# Patient Record
Sex: MALE | Race: BLACK OR AFRICAN AMERICAN | NOT HISPANIC OR LATINO | URBAN - METROPOLITAN AREA
[De-identification: names, ages, dates, MRNs, and addresses within clinical notes are randomized per-mention and may not be internally consistent; named-entity substitution may affect disease eponyms.]

---

## 2020-06-29 ENCOUNTER — EMERGENCY (EMERGENCY)
Facility: HOSPITAL | Age: 45
LOS: 1 days | Discharge: HOME | End: 2020-06-29
Attending: EMERGENCY MEDICINE
Payer: SELF-PAY

## 2020-06-29 VITALS
HEIGHT: 68 IN | HEART RATE: 77 BPM | TEMPERATURE: 98 F | DIASTOLIC BLOOD PRESSURE: 78 MMHG | OXYGEN SATURATION: 99 % | SYSTOLIC BLOOD PRESSURE: 106 MMHG | RESPIRATION RATE: 16 BRPM | WEIGHT: 177.91 LBS

## 2020-06-29 PROCEDURE — 99282 EMERGENCY DEPT VISIT SF MDM: CPT

## 2020-06-29 NOTE — ED PROVIDER NOTE - OBJECTIVE STATEMENT
44 year old male with history of a root canal 8 years ago presenting with pain in the left first molar today. Patient notes that several days ago he thinks that he cracked his tooth while he was eating. Patient called 911 in order to come to the ED and the tooth to be pulled out. Patient denies congestion, runny nose, fever, cough, shortness of breath, chest pain, or any other acute complaints.

## 2020-06-29 NOTE — ED PROVIDER NOTE - CLINICAL SUMMARY MEDICAL DECISION MAKING FREE TEXT BOX
Patient offered the patient analgesia but patient requesting tooth to be pulled out. Educated patient that we are not able to pull out the tooth and provided him with information on emergency and outpatient dental clinics. Discharge the patient and follow up with dentistry.

## 2020-06-29 NOTE — ED ADULT NURSE NOTE - CAS ELECT INFOMATION PROVIDED
DC instructions/seen, treated, and released in intake by Dr. gentile. No nursing intervention required.

## 2020-06-29 NOTE — ED PROVIDER NOTE - ENMT, MLM
Left first molar of the left lower jaw chipped at multiple locations. Not freely moving. No area of inflammation or evidence of infection/abscess.

## 2020-06-29 NOTE — ED PROVIDER NOTE - PATIENT PORTAL LINK FT
You can access the FollowMyHealth Patient Portal offered by [facility] by registering at the following website: [email or web address]. By joining [employer]’s FollowMyHealth portal, you will also be able to view your health information using other applications (apps) compatible with our system.

## 2021-09-15 ENCOUNTER — HOSPITAL ENCOUNTER (EMERGENCY)
Age: 46
Discharge: HOME | End: 2021-09-15
Payer: MEDICARE

## 2021-09-15 VITALS
OXYGEN SATURATION: 99 % | BODY MASS INDEX: 28.92 KG/M2 | DIASTOLIC BLOOD PRESSURE: 79 MMHG | HEIGHT: 70 IN | RESPIRATION RATE: 14 BRPM | HEART RATE: 74 BPM | SYSTOLIC BLOOD PRESSURE: 135 MMHG | WEIGHT: 202 LBS | TEMPERATURE: 98.1 F

## 2021-09-15 DIAGNOSIS — L28.2 PRURITIC RASH: Primary | ICD-10-CM

## 2021-09-15 PROCEDURE — 99283 EMERGENCY DEPT VISIT LOW MDM: CPT

## 2021-09-15 RX ORDER — CETIRIZINE HYDROCHLORIDE 10 MG/1
10 TABLET ORAL DAILY
Qty: 30 TABLET | Refills: 0 | Status: SHIPPED | OUTPATIENT
Start: 2021-09-15 | End: 2021-10-15

## 2021-09-15 ASSESSMENT — ENCOUNTER SYMPTOMS
SHORTNESS OF BREATH: 0
DIARRHEA: 0
NAUSEA: 0
COLOR CHANGE: 0
ABDOMINAL PAIN: 0
VOMITING: 0
CHEST TIGHTNESS: 0

## 2021-09-15 NOTE — ED PROVIDER NOTES
[facility]        Pt Name: Mary Kay Mcclendon  MRN: [id number]  [place] 1975  Date of evaluation: 9/15/2021  Provider: Mirian Ash PA-C  PCP: No primary care provider on file. Note Started: 12:25 PM EDT       ARIANA. I have evaluated this patient. My supervising physician was available for consultation. CHIEF COMPLAINT       Chief Complaint   Patient presents with    Rash     Pt to ER from home for complaint of what he believes is a reaction to a bug bite that started in May. Pt states originally broke out with bumps all over, resolved and now intermittently come and go. Pt states he has severe itching that comes and goes as well. Pt states having swelling in axilla now. HISTORY OF PRESENT ILLNESS   (Location, Timing/Onset, Context/Setting, Quality, Duration, Modifying Factors, Severity, Associated Signs and Symptoms)  Note limiting factors. Chief Complaint: Intermittent rash    Mary Kay Mcclendon is a 41 y.o. male who presents to the emergency department with what he describes as being symptoms of intermittent rash. Patient tells me he got bit by a bug in May. He states initially he broke out with a rash all over his trunk and in his underarms. He states it is since resolved but now gets it intermittently. He states unfortunately a rash he gets is markedly pruritic. He states normally they are very small raised red bumps. He states that they do have a sensation that are on when he scratches them. He states that he most recently had them on the anterior aspect of his chest and on the border just underneath his left arm. When asked to the possibility of any new soaps detergents or deodorants the patient states that this is not what is causing it. He thinks this is sequela from the bug bite. He states that he had the rash this morning but as soon as he got here to the emergency department it seemed to go away.   He states despite that he still has some complaints that it is itchy over the midportion of his chest.  No additional complaints or concerns. He presents the ED for evaluation and treatment. Nursing Notes were all reviewed and agreed with or any disagreements were addressed in the HPI. REVIEW OF SYSTEMS    (2-9 systems for level 4, 10 or more for level 5)     Review of Systems   Constitutional: Negative for activity change, chills and fever. Respiratory: Negative for chest tightness and shortness of breath. Cardiovascular: Negative for chest pain. Gastrointestinal: Negative for abdominal pain, diarrhea, nausea and vomiting. Genitourinary: Negative for dysuria and flank pain. Skin: Positive for rash. Negative for color change. All other systems reviewed and are negative. Positives and Pertinent negatives as per HPI. Except as noted above in the ROS, all other systems were reviewed and negative. PAST MEDICAL HISTORY   History reviewed. No pertinent past medical history. SURGICAL HISTORY   History reviewed. No pertinent surgical history. CURRENTMEDICATIONS       Previous Medications    No medications on file         ALLERGIES     Patient has no known allergies. FAMILYHISTORY     History reviewed. No pertinent family history. SOCIAL HISTORY       Social History     Tobacco Use    Smoking status: Never Smoker    Smokeless tobacco: Never Used   Substance Use Topics    Alcohol use: Yes     Comment: Social    Drug use: Never       SCREENINGS             PHYSICAL EXAM    (up to 7 for level 4, 8 or more for level 5)     ED Triage Vitals [09/15/21 1206]   BP Temp Temp Source Pulse Resp SpO2 Height Weight   135/79 98.1 °F (36.7 °C) Oral 74 14 99 % 5' 10\" (1.778 m) 202 lb (91.6 kg)       Physical Exam  Vitals and nursing note reviewed. Constitutional:       General: He is awake. He is not in acute distress. Appearance: Normal appearance. He is well-developed.  He is not diaphoretic. HENT:      Head: Normocephalic and atraumatic. No raccoon eyes, Kingston's sign or laceration. Right Ear: External ear normal.      Left Ear: External ear normal.   Eyes:      General: No scleral icterus. Right eye: No discharge. Left eye: No discharge. Conjunctiva/sclera: Conjunctivae normal.   Neck:      Vascular: No JVD. Cardiovascular:      Rate and Rhythm: Normal rate and regular rhythm. Heart sounds: No murmur heard. No friction rub. No gallop. Pulmonary:      Effort: Pulmonary effort is normal. No accessory muscle usage or respiratory distress. Breath sounds: Normal breath sounds. No wheezing, rhonchi or rales. Musculoskeletal:      Cervical back: Normal range of motion. Skin:     General: Skin is warm and dry. Comments: At the present time there is no evidence of rash from the patient's waistline up. Lower legs are unremarkable. Reports of some mild pruritus anterior based chest.   Neurological:      Mental Status: He is alert and oriented to person, place, and time. GCS: GCS eye subscore is 4. GCS verbal subscore is 5. GCS motor subscore is 6. Cranial Nerves: No cranial nerve deficit. Sensory: No sensory deficit. Coordination: Coordination normal.   Psychiatric:         Behavior: Behavior normal. Behavior is cooperative. DIAGNOSTIC RESULTS   LABS:    Labs Reviewed - No data to display    When ordered only abnormal lab results are displayed. All other labs were within normal range or not returned as of this dictation. EKG: When ordered, EKG's are interpreted by the Emergency Department Physician in the absence of a cardiologist.  Please see their note for interpretation of EKG.     RADIOLOGY:   Non-plain film images such as CT, Ultrasound and MRI are read by the radiologist. Plain radiographic images are visualized and preliminarily interpreted by the ED Provider with the below findings:        Interpretation per the Radiologist below, if available at the time of this note:    No orders to display     No results found. PROCEDURES   Unless otherwise noted below, none     Procedures    CRITICAL CARE TIME   N/A    CONSULTS:  None      EMERGENCY DEPARTMENT COURSE and DIFFERENTIAL DIAGNOSIS/MDM:   Vitals:    Vitals:    09/15/21 1206   BP: 135/79   Pulse: 74   Resp: 14   Temp: 98.1 °F (36.7 °C)   TempSrc: Oral   SpO2: 99%   Weight: 202 lb (91.6 kg)   Height: 5' 10\" (1.778 m)       Patient was given the following medications:  Medications - No data to display        The patient's detailed history of present illness is documented as above. Upon arrival to the emergency department the patient's vital signs are as documented. The patient is noted to be hemodynamically stable and afebrile. Physical examination findings are as above. At the present time there is no discernible rash. Patient states despite that he still is itchy because he had a rash this morning. I discussed with him I thought that this likely was allergic in nature especially since his been ongoing. Suggested that he investigate for possible new soaps or deodorant since he if indeed this could be causing it. In the interim I will place him on Zyrtec for a month and see if we can control the symptoms. He does not have a primary care provider will be given appropriate referral.  Strict potential instructions for return. The patient has been made aware of the signs and symptoms which would necessitate an immediate return to the emergency department and verbalizes an understanding of these signs and symptoms. My suspicion is low for serious dermatologic pathology including: HHV-6, fifth disease, coxsackievirus, Kawasaki's, HSP, staph scalded skin syndrome, najma mountian spotted fever, chicken pox  meningococcemia, rubella, and/or rubeola at the present time. FINAL IMPRESSION      1.  Intermittent pruritic rash since May 2021          DISPOSITION/PLAN DISPOSITION Decision To Discharge 09/15/2021 12:26:41 PM      PATIENT REFERRED TO:  [facility] Emergency Department  [address]  [facility]  [phone]    If symptoms worsen      DISCHARGE MEDICATIONS:  New Prescriptions    CETIRIZINE (ZYRTEC) 10 MG TABLET    Take 1 tablet by mouth daily       DISCONTINUED MEDICATIONS:  Discontinued Medications    No medications on file              (Please note that portions of this note were completed with a voice recognition program.  Efforts were made to edit the dictations but occasionally words are mis-transcribed.)    Isra Sandoval PA-C (electronically signed)           Terra Vale PA-C  09/15/21 9634

## 2021-10-13 ENCOUNTER — HOSPITAL ENCOUNTER (EMERGENCY)
Age: 46
Discharge: HOME | End: 2021-10-13
Attending: EMERGENCY MEDICINE
Payer: MEDICARE

## 2021-10-13 VITALS
TEMPERATURE: 98.8 F | DIASTOLIC BLOOD PRESSURE: 79 MMHG | SYSTOLIC BLOOD PRESSURE: 133 MMHG | OXYGEN SATURATION: 96 % | HEART RATE: 81 BPM | RESPIRATION RATE: 18 BRPM

## 2021-10-13 DIAGNOSIS — B35.4 TINEA CORPORIS: ICD-10-CM

## 2021-10-13 DIAGNOSIS — H61.21 RIGHT EAR IMPACTED CERUMEN: Primary | ICD-10-CM

## 2021-10-13 PROCEDURE — 99283 EMERGENCY DEPT VISIT LOW MDM: CPT

## 2021-10-13 PROCEDURE — 69209 REMOVE IMPACTED EAR WAX UNI: CPT

## 2021-10-13 PROCEDURE — 6370000000 HC RX 637 (ALT 250 FOR IP): Performed by: EMERGENCY MEDICINE

## 2021-10-13 RX ORDER — CLOTRIMAZOLE 1 %
CREAM (GRAM) TOPICAL
Qty: 24 G | Refills: 1 | Status: SHIPPED | OUTPATIENT
Start: 2021-10-13 | End: 2021-10-20

## 2021-10-13 RX ADMIN — Medication 10 DROP: at 17:46

## 2021-10-13 NOTE — ED PROVIDER NOTES
[facility] [place]  Chief Complaint   Patient presents with    Medication Refill     pt asking for a refill on doxcycline and clomitrozole ointment for reoccurrance rash on left leg    Otalgia     rt ear feels like it is clogged     HISTORY OF PRESENT ILLNESS  Madhu Gilliland is a 41 y.o. male who presents to the ED complaining of 2 separate complaints. First complaint is right ear diminished hearing and feeling like it is clogged and unable to pop. It is not necessarily painful for him though. He denies any fevers or symptoms in the left ear or nasal congestion or sore throat. Secondary complaint is itchy scaling dry rash to both inner thighs. He apparently over a year ago he had a similar rash and was prescribed doxycycline as well as clotrimazole but never got them filled and never took it. He has a rash again. He has tried topical use of alcohol which helps with the itching. No other interventions tried. He is asking specifically for the same medications that he actually has the torn up prescriptions of never got them filled when he was in [place] previously. No other complaints, modifying factors or associated symptoms. Nursing notes reviewed. History reviewed. No pertinent past medical history. History reviewed. No pertinent surgical history. History reviewed. No pertinent family history.   Social History     Socioeconomic History    Marital status: Single     Spouse name: Not on file    Number of children: Not on file    Years of education: Not on file    Highest education level: Not on file   Occupational History    Not on file   Tobacco Use    Smoking status: Never Smoker    Smokeless tobacco: Never Used   Substance and Sexual Activity    Alcohol use: Yes     Comment: Social    Drug use: Never    Sexual activity: Not on file   Other Topics Concern    Not on file   Social History Narrative    Not on file     Social Determinants of Health     Financial Resource Strain:     Difficulty of Paying Living Expenses:    Food Insecurity:     Worried About Running Out of Food in the Last Year:     [address] in the Last Year:    Transportation Needs:     Lack of Transportation (Medical):  Lack of Transportation (Non-Medical):    Physical Activity:     Days of Exercise per Week:     Minutes of Exercise per Session:    Stress:     Feeling of Stress :    Social Connections:     Frequency of Communication with Friends and Family:     Frequency of Social Gatherings with Friends and Family:     Attends [employer] Services:     Active Member of Clubs or Organizations:     Attends Club or Organization Meetings:     Marital Status:    Intimate Partner Violence:     Fear of Current or Ex-Partner:     Emotionally Abused:     Physically Abused:     Sexually Abused:      No current facility-administered medications for this encounter. Current Outpatient Medications   Medication Sig Dispense Refill    clotrimazole (LOTRIMIN) 1 % cream Apply topically 2 times daily for 14 days. 24 g 1    cetirizine (ZYRTEC) 10 MG tablet Take 1 tablet by mouth daily 30 tablet 0     No Known Allergies    REVIEW OF SYSTEMS  6 systems reviewed, pertinent positives per HPI otherwise noted to be negative    PHYSICAL EXAM   /79   Pulse 81   Temp 98.8 °F (37.1 °C) (Oral)   Resp 18   SpO2 96%    GENERAL APPEARANCE: Awake and alert. Cooperative. No acute distress. HEAD: Normocephalic. Atraumatic. EYES: PERRL. EOM's grossly intact. ENT: Mucous membranes are moist.  Oropharynx clear, nasopharynx clear. Left TM is clear. No cerumen noted on the left. Right EAC is completely occluded by cerumen. After disimpaction, ear canal was a little irritated with minimal remaining cerumen, TM not injected or perforated. NECK: Supple. Normal ROM. No LAD. CHEST: Equal symmetric chest rise. RRR  LUNGS: Breathing is unlabored.  Speaking comfortably in full sentences. CTAB  ABDOMEN: Nondistended, nontender  EXTREMITIES: MAEE. No acute deformities. SKIN: Warm and dry. Dry scaling rash without erythema pustules blistering or warmth or significant tenderness noted to both proximal medial thighs consistent with tinea. No urticaria. NEUROLOGICAL: Alert and oriented. Strength is 5/5 in all extremities and sensation is intact. ED COURSE/MDM  The patient's ED course was notable for 2 separate complaints. 1.-Right cerumen impaction. Disimpacted per the procedure note below. No evidence of TM rupture or otitis media. No problems on the left. Feeling only a little better after this was done however there was some EAC irritation, no sign of TM perforation or persistent obstruction or AOM. F/u ENT PRN. 2.-Bilateral inner thigh rash consistent with tinea. Will prescribe clotrimazole. Although he was given doxycycline on the previous visit over a year ago and never actually took it, this does not appear at all bacterial to me and therefore he does not have any indication for an antibiotic and therefore we will just try topical antifungals for now. New PCP referral made. Ear Wax Removal    Date/Time: 10/13/2021 5:48 PM  Performed by: Mariah Duque MD  Authorized by: Mariah Duque MD     Consent:     Consent obtained:  Verbal    Consent given by:  Patient    Risks discussed:  Bleeding, dizziness, incomplete removal, TM perforation, pain and infection    Alternatives discussed:  No treatment  Procedure details:     Location:  R ear    Procedure type: irrigation    Post-procedure details: Inspection:  Macerated skin and TM intact    Hearing quality:  Improved    Patient tolerance of procedure: Tolerated well, no immediate complications        Patient was given scripts for the following medications. I counseled patient how to take these medications.    Discharge Medication List as of 10/13/2021  6:32 PM      START taking these medications    Details   clotrimazole (LOTRIMIN) 1 % cream Apply topically 2 times daily for 14 days. , Disp-24 g, R-1, Print               CLINICAL IMPRESSION  1. Right ear impacted cerumen    2. Tinea corporis        Blood pressure 133/79, pulse 81, temperature 98.8 °F (37.1 °C), temperature source Oral, resp. rate 18, SpO2 96 %. DISPOSITION    I have discussed the findings of today's workup with the patient and addressed the patient's questions and concerns. Important warning signs as well as new or worsening symptoms which would necessitate immediate return to the ED were discussed. The plan is to discharge from the ED at this time, and the patient is in stable condition. The patient acknowledged understanding is agreeable with this plan. Follow-up with:  [facility] Emergency Department  [address] [place] [address]  [phone]  Go to   If symptoms worsen    New PCP referral made          April Gerard, [address]/ [place] [id number] [phone]    Schedule an appointment as soon as possible for a visit in 2 days  For symptom re-evaluation if your ear is not improving as expected      This chart was created using Dragon dictation software. Efforts were made by me to ensure accuracy, however some errors may be present due to limitations of this technology.         Magy Moreno MD  10/13/21 1504

## 2021-11-05 ENCOUNTER — HOSPITAL ENCOUNTER (EMERGENCY)
Age: 46
Discharge: HOME | End: 2021-11-05
Payer: MEDICARE

## 2021-11-05 VITALS
DIASTOLIC BLOOD PRESSURE: 70 MMHG | WEIGHT: 180 LBS | RESPIRATION RATE: 16 BRPM | BODY MASS INDEX: 25.83 KG/M2 | HEART RATE: 82 BPM | TEMPERATURE: 97 F | OXYGEN SATURATION: 98 % | SYSTOLIC BLOOD PRESSURE: 124 MMHG

## 2021-11-05 DIAGNOSIS — R21 RASH AND OTHER NONSPECIFIC SKIN ERUPTION: Primary | ICD-10-CM

## 2021-11-05 PROCEDURE — 99282 EMERGENCY DEPT VISIT SF MDM: CPT

## 2021-11-05 RX ORDER — CEPHALEXIN 500 MG/1
500 CAPSULE ORAL 4 TIMES DAILY
Qty: 40 CAPSULE | Refills: 0 | Status: SHIPPED | OUTPATIENT
Start: 2021-11-05 | End: 2021-11-15

## 2021-11-05 RX ORDER — CLOTRIMAZOLE AND BETAMETHASONE DIPROPIONATE 10; .64 MG/G; MG/G
CREAM TOPICAL
Qty: 45 G | Refills: 0 | Status: SHIPPED | OUTPATIENT
Start: 2021-11-05 | End: 2021-11-27 | Stop reason: SDUPTHER

## 2021-11-05 ASSESSMENT — ENCOUNTER SYMPTOMS
DIARRHEA: 0
COUGH: 0
VOMITING: 0
CHEST TIGHTNESS: 0
ABDOMINAL PAIN: 0
COLOR CHANGE: 1
CONSTIPATION: 0
NAUSEA: 0
SHORTNESS OF BREATH: 0
RESPIRATORY NEGATIVE: 1

## 2021-11-05 ASSESSMENT — PAIN SCALES - GENERAL: PAINLEVEL_OUTOF10: 6

## 2021-11-05 NOTE — ED PROVIDER NOTES
[facility]        Pt Name: Jamal Dixon  MRN: [id number]  [place] 1975  Date of evaluation: 11/5/2021  Provider: DIONI Ludwig  PCP: No primary care provider on file. Note Started: 10:23 AM EDT       ARIANA. I have evaluated this patient. My supervising physician was available for consultation. CHIEF COMPLAINT       Chief Complaint   Patient presents with    Rash     pt reports itchy rash to groin times 6 months       HISTORY OF PRESENT ILLNESS   (Location, Timing/Onset, Context/Setting, Quality, Duration, Modifying Factors, Severity, Associated Signs and Symptoms)  Note limiting factors. Chief Complaint: Rash     Jamal Dixon is a 41 y.o. male with no significant past medical history who presents to the ED with complaint of a rash. Patient states he has an itchy rash to his groin bilaterally for the past 6 months. States he was seen here several weeks ago and states he was placed on antifungal cream.  Patient states he has been using but states has not gotten any improvement with it. States he continues to have rash that is itchy. Denies any pain. Patient states he has some small pustules that have since developed. Denies any bleeding or drainage. Denies any fever chills. Denies any new contacts including soaps, detergents, medicines, foods, cosmetics or lotions. Denies any new clothing. Became concerned and came to the ED for further evaluation treatment. Denies any other rash throughout. Nursing Notes were all reviewed and agreed with or any disagreements were addressed in the HPI. REVIEW OF SYSTEMS    (2-9 systems for level 4, 10 or more for level 5)     Review of Systems   Constitutional: Negative for activity change, appetite change, chills, diaphoresis, fatigue and fever. Respiratory: Negative. Negative for cough, chest tightness and shortness of breath. Cardiovascular: Negative. Negative for chest pain. Gastrointestinal: Negative for abdominal pain, constipation, diarrhea, nausea and vomiting. Genitourinary: Negative for decreased urine volume, difficulty urinating, dysuria, flank pain, frequency, hematuria and urgency. Musculoskeletal: Negative for arthralgias, myalgias, neck pain and neck stiffness. Skin: Positive for color change and rash. Negative for pallor and wound. Neurological: Negative for dizziness, weakness, light-headedness, numbness and headaches. Positives and Pertinent negatives as per HPI. Except as noted above in the ROS, all other systems were reviewed and negative. PAST MEDICAL HISTORY   History reviewed. No pertinent past medical history. SURGICAL HISTORY   History reviewed. No pertinent surgical history. CURRENTMEDICATIONS       Previous Medications    No medications on file         ALLERGIES     Patient has no known allergies. FAMILYHISTORY     History reviewed. No pertinent family history. SOCIAL HISTORY       Social History     Tobacco Use    Smoking status: Never Smoker    Smokeless tobacco: Never Used   Substance Use Topics    Alcohol use: Yes     Comment: Social    Drug use: Never       SCREENINGS             PHYSICAL EXAM    (up to 7 for level 4, 8 or more for level 5)     ED Triage Vitals [11/05/21 1001]   BP Temp Temp Source Pulse Resp SpO2 Height Weight   124/70 97 °F (36.1 °C) Infrared 82 16 98 % -- 180 lb (81.6 kg)       Physical Exam  Constitutional:       General: He is not in acute distress. Appearance: Normal appearance. He is well-developed. He is not ill-appearing, toxic-appearing or diaphoretic. HENT:      Head: Normocephalic and atraumatic. Right Ear: External ear normal.      Left Ear: External ear normal.   Eyes:      General:         Right eye: No discharge. Left eye: No discharge. Pulmonary:      Effort: Pulmonary effort is normal. No respiratory distress. Breath sounds:  No stridor. Musculoskeletal:         General: Normal range of motion. Cervical back: Normal range of motion and neck supple. Skin:     General: Skin is warm and dry. Coloration: Skin is not pale. Findings: Rash present. No erythema. Comments: Patient has slightly raised erythematous rash with some excoriations and what appears to be several small pustules noted to the bilateral thighs and into the inguinal creases bilaterally. Does not involve the perineum or scrotum/testicles. No other rash noted throughout. No rash noted to the palms. There is no induration or fluctuance. No crepitus. No tenderness over this area. There is no scaling. Neurological:      Mental Status: He is alert and oriented to person, place, and time. Psychiatric:         Behavior: Behavior normal.         DIAGNOSTIC RESULTS   LABS:    Labs Reviewed - No data to display    When ordered only abnormal lab results are displayed. All other labs were within normal range or not returned as of this dictation. EKG: When ordered, EKG's are interpreted by the Emergency Department Physician in the absence of a cardiologist.  Please see their note for interpretation of EKG. RADIOLOGY:   Non-plain film images such as CT, Ultrasound and MRI are read by the radiologist. Plain radiographic images are visualized and preliminarily interpreted by the ED Provider with the below findings:        Interpretation per the Radiologist below, if available at the time of this note:    No orders to display     No results found.         PROCEDURES   Unless otherwise noted below, none     Procedures    CRITICAL CARE TIME   N/A    CONSULTS:  None      EMERGENCY DEPARTMENT COURSE and DIFFERENTIAL DIAGNOSIS/MDM:   Vitals:    Vitals:    11/05/21 1001   BP: 124/70   Pulse: 82   Resp: 16   Temp: 97 °F (36.1 °C)   TempSrc: Infrared   SpO2: 98%   Weight: 180 lb (81.6 kg)       Patient was given the following medications:  Medications - No data to display        Patient is a 71-year-old male who presents to the ED with complaint of a rash. Patient has rash noted to the bilateral thighs and inguinal creases bilaterally. There are some excoriations noted. There is a couple small pustules. No induration or fluctuance. Do not believe represents cellulitis, abscess or necrotizing infection at this time. Could potentially be fungal but patient states he is already been on clotrimazole ointment and states has not been improved. Patient symptoms could be related to potential dermatitis secondary to sweating and the skin fold. We will give prescription for Lotrisone cream for home. Will give Keflex for empiric antibiotic coverage given pustules. Low suspicion for staph infection. Low suspicion for angioedema, anaphylaxis, SJS, HSP, TEN, scabies, bedbugs, varicella, milia, erysipelas, scalded skin syndrome, meningococcemia, occult bacteremia, necrotizing fasciitis, folliculitis, cellulitis, abscess or other emergent etiology at this time. FINAL IMPRESSION      1. Rash and other nonspecific skin eruption          DISPOSITION/PLAN   DISPOSITION Decision To Discharge 11/05/2021 10:21:20 AM      PATIENT REFERRED TO:  [facility] Emergency Department  [address]  [phone]  Go to   As needed, If symptoms worsen    RADHA MORTENSEN  Dermatology  [facility] [id number] [phone]  Schedule an appointment as soon as possible for a visit   For a Re-check in   7-10   days. DISCHARGE MEDICATIONS:  New Prescriptions    CEPHALEXIN (KEFLEX) 500 MG CAPSULE    Take 1 capsule by mouth 4 times daily for 10 days    CLOTRIMAZOLE-BETAMETHASONE (LOTRISONE) 1-0.05 % CREAM    Apply topically 2 times daily.        DISCONTINUED MEDICATIONS:  Discontinued Medications    No medications on file              (Please note that portions of this note were completed with a voice recognition program.  Efforts were made to edit the dictations but occasionally words are mis-transcribed.)    DIONI Jean (electronically signed)          DIONI Cm  11/05/21 8994

## 2021-11-27 ENCOUNTER — HOSPITAL ENCOUNTER (EMERGENCY)
Age: 46
Discharge: HOME | End: 2021-11-27
Payer: MEDICARE

## 2021-11-27 VITALS
DIASTOLIC BLOOD PRESSURE: 60 MMHG | SYSTOLIC BLOOD PRESSURE: 129 MMHG | TEMPERATURE: 98.1 F | WEIGHT: 180 LBS | OXYGEN SATURATION: 96 % | HEIGHT: 70 IN | HEART RATE: 79 BPM | RESPIRATION RATE: 20 BRPM | BODY MASS INDEX: 25.77 KG/M2

## 2021-11-27 DIAGNOSIS — B35.6 TINEA CRURIS: Primary | ICD-10-CM

## 2021-11-27 PROCEDURE — 99283 EMERGENCY DEPT VISIT LOW MDM: CPT

## 2021-11-27 RX ORDER — CLOTRIMAZOLE AND BETAMETHASONE DIPROPIONATE 10; .64 MG/G; MG/G
CREAM TOPICAL
Qty: 45 G | Refills: 0 | Status: SHIPPED | OUTPATIENT
Start: 2021-11-27 | End: 2021-11-30

## 2021-11-27 ASSESSMENT — ENCOUNTER SYMPTOMS
NAUSEA: 0
ABDOMINAL PAIN: 0
DIARRHEA: 0
SHORTNESS OF BREATH: 0
CHEST TIGHTNESS: 0
VOMITING: 0

## 2021-11-27 NOTE — ED PROVIDER NOTES
[address] [facility]        Pt Name: Mary Quinn  MRN: [id number]  [place] 1975  Date of evaluation: 11/27/2021  Provider: Colletta Schiff, PA-C  PCP: No primary care provider on file. Note Started: 9:37 AM EST       ARIANA. I have evaluated this patient. My supervising physician was available for consultation. CHIEF COMPLAINT       Chief Complaint   Patient presents with    Medication Refill     Pt states he was seen here recently for a rash on his legs, states he is out of the cream he was prescribed and needs a refill until his drJorge appt. in 2 weeks. HISTORY OF PRESENT ILLNESS   (Location, Timing/Onset, Context/Setting, Quality, Duration, Modifying Factors, Severity, Associated Signs and Symptoms)  Note limiting factors. Chief Complaint: Recurrent tinea cruris    Mary Quinn is a 41 y.o. male who presents to the emergency department with difficulties as a pertains to a inguinal and upper leg rash. Patient states that he has had ongoing difficulties as a pertains to tinea. He had been applying Lotrimin to marginal levels of relief when he had been seen and evaluated in the ED for this in the past.  He states the Lotrisone works much better for him. He is requesting a refill of this. Patient states he still has not seen by primary care provider. He still has not had follow-up for his condition dermatologically. He literally is merely requesting repeat refill for medication. Nursing Notes were all reviewed and agreed with or any disagreements were addressed in the HPI. REVIEW OF SYSTEMS    (2-9 systems for level 4, 10 or more for level 5)     Review of Systems   Constitutional: Negative for activity change, chills and fever. Respiratory: Negative for chest tightness and shortness of breath. Cardiovascular: Negative for chest pain.    Gastrointestinal: Negative for abdominal pain, diarrhea, nausea and vomiting. Genitourinary: Negative for dysuria and flank pain. Skin: Positive for rash. All other systems reviewed and are negative. Positives and Pertinent negatives as per HPI. Except as noted above in the ROS, all other systems were reviewed and negative. PAST MEDICAL HISTORY   History reviewed. No pertinent past medical history. SURGICAL HISTORY   History reviewed. No pertinent surgical history. CURRENTMEDICATIONS       Previous Medications    No medications on file         ALLERGIES     Patient has no known allergies. FAMILYHISTORY     History reviewed. No pertinent family history. SOCIAL HISTORY       Social History     Tobacco Use    Smoking status: Never Smoker    Smokeless tobacco: Never Used   Substance Use Topics    Alcohol use: Yes     Comment: Social    Drug use: Never       SCREENINGS             PHYSICAL EXAM    (up to 7 for level 4, 8 or more for level 5)     ED Triage Vitals [11/27/21 0855]   BP Temp Temp Source Pulse Resp SpO2 Height Weight   129/60 98.1 °F (36.7 °C) Oral 79 20 96 % 5' 10\" (1.778 m) 180 lb (81.6 kg)       Physical Exam  Vitals and nursing note reviewed. Constitutional:       General: He is awake. He is not in acute distress. Appearance: Normal appearance. He is well-developed. He is not diaphoretic. HENT:      Head: Normocephalic and atraumatic. No raccoon eyes, Kingston's sign or laceration. Right Ear: External ear normal.      Left Ear: External ear normal.   Eyes:      General: No scleral icterus. Right eye: No discharge. Left eye: No discharge. Conjunctiva/sclera: Conjunctivae normal.   Neck:      Vascular: No JVD. Cardiovascular:      Rate and Rhythm: Normal rate and regular rhythm. Heart sounds: No murmur heard. No friction rub. No gallop. Pulmonary:      Effort: Pulmonary effort is normal. No accessory muscle usage or respiratory distress. Breath sounds: Normal breath sounds.  No wheezing, rhonchi or rales. Musculoskeletal:      Cervical back: Normal range of motion. Skin:     General: Skin is warm and dry. Comments: Tinea cruris bilateral inguinal region and upper inner thighs. Neurological:      Mental Status: He is alert and oriented to person, place, and time. GCS: GCS eye subscore is 4. GCS verbal subscore is 5. GCS motor subscore is 6. Cranial Nerves: No cranial nerve deficit. Sensory: No sensory deficit. Coordination: Coordination normal.   Psychiatric:         Behavior: Behavior normal. Behavior is cooperative. DIAGNOSTIC RESULTS   LABS:    Labs Reviewed - No data to display    When ordered only abnormal lab results are displayed. All other labs were within normal range or not returned as of this dictation. EKG: When ordered, EKG's are interpreted by the Emergency Department Physician in the absence of a cardiologist.  Please see their note for interpretation of EKG. RADIOLOGY:   Non-plain film images such as CT, Ultrasound and MRI are read by the radiologist. Plain radiographic images are visualized and preliminarily interpreted by the ED Provider with the below findings:        Interpretation per the Radiologist below, if available at the time of this note:    No orders to display     No results found. PROCEDURES   Unless otherwise noted below, none     Procedures    CRITICAL CARE TIME   N/A    CONSULTS:  None      EMERGENCY DEPARTMENT COURSE and DIFFERENTIAL DIAGNOSIS/MDM:   Vitals:    Vitals:    11/27/21 0855   BP: 129/60   Pulse: 79   Resp: 20   Temp: 98.1 °F (36.7 °C)   TempSrc: Oral   SpO2: 96%   Weight: 180 lb (81.6 kg)   Height: 5' 10\" (1.778 m)       Patient was given the following medications:  Medications - No data to display        The patient's detailed history of present illness is documented as above. Upon arrival to the emergency department the patient's vital signs are as documented.  The patient is noted to be hemodynamically stable and afebrile. Physical examination findings are as above. I did explain to the patient that I do have concerns for the recurrence. Have also explained that the medication will be refilled as a courtesy. I suggested that he establish care with a primary care provider. Strict potential instructions for return. The patient has been made aware of the signs and symptoms which would necessitate an immediate return to the emergency department and verbalizes an understanding of these signs and symptoms. My suspicion is low for serious dermatologic pathology including: HHV-6, fifth disease, coxsackievirus, Kawasaki's, HSP, staph scalded skin syndrome, najma mountian spotted fever, chicken pox  meningococcemia, rubella, and/or rubeola at the present time. FINAL IMPRESSION      1.  Tinea cruris          DISPOSITION/PLAN   DISPOSITION Decision To Discharge 11/27/2021 09:37:43 AM      PATIENT REFERRED TO:  [facility] Emergency Department  [address] [place] [id number]    If symptoms worsen      DISCHARGE MEDICATIONS:  New Prescriptions    No medications on file       DISCONTINUED MEDICATIONS:  Discontinued Medications    No medications on file            (Please note that portions of this note were completed with a voice recognition program.  Efforts were made to edit the dictations but occasionally words are mis-transcribed.)    Colletta Schiff, PA-C (electronically signed)           Cornelia Hoffman PA-C  11/27/21 1007

## 2021-11-30 ENCOUNTER — OFFICE VISIT (OUTPATIENT)
Dept: FAMILY MEDICINE CLINIC | Age: 46
End: 2021-11-30
Payer: MEDICARE

## 2021-11-30 VITALS
BODY MASS INDEX: 30.64 KG/M2 | SYSTOLIC BLOOD PRESSURE: 120 MMHG | OXYGEN SATURATION: 98 % | WEIGHT: 214 LBS | HEIGHT: 70 IN | HEART RATE: 94 BPM | DIASTOLIC BLOOD PRESSURE: 86 MMHG

## 2021-11-30 DIAGNOSIS — L30.9 DERMATITIS: ICD-10-CM

## 2021-11-30 DIAGNOSIS — Z00.00 ANNUAL PHYSICAL EXAM: Primary | ICD-10-CM

## 2021-11-30 PROCEDURE — 99386 PREV VISIT NEW AGE 40-64: CPT | Performed by: FAMILY MEDICINE

## 2021-11-30 PROCEDURE — G8484 FLU IMMUNIZE NO ADMIN: HCPCS | Performed by: FAMILY MEDICINE

## 2021-11-30 RX ORDER — CLOTRIMAZOLE 1 %
CREAM (GRAM) TOPICAL
COMMUNITY
Start: 2021-10-25 | End: 2021-11-30

## 2021-11-30 RX ORDER — TRIAMCINOLONE ACETONIDE 1 MG/ML
LOTION TOPICAL
Qty: 60 ML | Refills: 1 | Status: SHIPPED | OUTPATIENT
Start: 2021-11-30 | End: 2021-12-07

## 2021-11-30 SDOH — ECONOMIC STABILITY: FOOD INSECURITY: WITHIN THE PAST 12 MONTHS, THE FOOD YOU BOUGHT JUST DIDN'T LAST AND YOU DIDN'T HAVE MONEY TO GET MORE.: NEVER TRUE

## 2021-11-30 SDOH — ECONOMIC STABILITY: FOOD INSECURITY: WITHIN THE PAST 12 MONTHS, YOU WORRIED THAT YOUR FOOD WOULD RUN OUT BEFORE YOU GOT MONEY TO BUY MORE.: NEVER TRUE

## 2021-11-30 ASSESSMENT — PATIENT HEALTH QUESTIONNAIRE - PHQ9
1. LITTLE INTEREST OR PLEASURE IN DOING THINGS: 0
SUM OF ALL RESPONSES TO PHQ QUESTIONS 1-9: 0
SUM OF ALL RESPONSES TO PHQ9 QUESTIONS 1 & 2: 0
SUM OF ALL RESPONSES TO PHQ QUESTIONS 1-9: 0
SUM OF ALL RESPONSES TO PHQ QUESTIONS 1-9: 0
2. FEELING DOWN, DEPRESSED OR HOPELESS: 0

## 2021-11-30 ASSESSMENT — SOCIAL DETERMINANTS OF HEALTH (SDOH): HOW HARD IS IT FOR YOU TO PAY FOR THE VERY BASICS LIKE FOOD, HOUSING, MEDICAL CARE, AND HEATING?: NOT HARD AT ALL

## 2021-11-30 NOTE — PROGRESS NOTES
[address] Note    Date: 11/30/2021                                               Marielena Gianni:     Chief Complaint   Patient presents with    New Patient    Rash     skin is itchy and tingy       HPI   Patient is here for annual exam.  Last Tdap unknown. Currently takes no medicines. Patient has concerns for itchy skin. Started few months ago after being bit by a bug. Is itchy with some bumps over arms and chest and back. Has not tried medicines. There are no problems to display for this patient. Past Medical History:   Diagnosis Date    Testicular torsion 1995       Current Outpatient Medications   Medication Sig Dispense Refill    triamcinolone (KENALOG) 0.1 % lotion Apply topically 3 times daily. 60 mL 1     No current facility-administered medications for this visit. No Known Allergies    Review of Systems   No CP, no SOB, no bruise, no HA, no vision change, no ankle swelling, no hearing problems, no LAD      Vitals:  /86   Pulse 94   Ht 5' 10\" (1.778 m)   Wt 214 lb (97.1 kg)   SpO2 98%   BMI 30.71 kg/m²     Wt Readings from Last 3 Encounters:   11/30/21 214 lb (97.1 kg)   11/27/21 180 lb (81.6 kg)   11/05/21 180 lb (81.6 kg)        Physical Exam   General:  Well-appearing, NAD, alert, non-toxic  HEENT:  Normocephalic, atraumatic. Pupils equal and round. TMs pearly with good landmarks. Moist mucous membranes. Normal dentition  NECK:  Supple, normal range of motion, no LAD, no meningeal signs, no JVD, nontender  CHEST/LUNGS: CTAB, no crackles, no wheeze, no rhonchi. Symmetric rise  CARDIOVASCULAR: RRR,  no murmur, no rub  ABDOMEN: Soft, non-tender, non-distended. No masses  EXTREMETIES: Normal movement of all extremities. No edema. No joint swelling. SKIN: + Minor scale and occasional erythematous minute papules over bilateral arms and back. No drainage noted.   PSYCH:  A+O x 3; normal affect  NEURO:  GCS 15, CN2-12 grossly intact, no focal motor/sensory deficits, no cerebellar deficits, normal gait, normal speech      Assessment/Plan     71-year-old male here for annual exam.  Tdap up-to-date. Patient declines flu shot. Check routine labs. Vital signs are stable. Overall is very healthy. Patient has dermatitis/xerosis of the back and arms. Will treat with triamcinolone, to be followed by daily moisturizing lotion as needed. Follow-up in 1 year or as needed. Discussed with patient medication/s dosage, instructions, potential S/E, A/R and Drug Interaction  Tylenol or Motrin as needed for pain or fever  Advise to return here if worse or go to nearest ER  Encourage fluids  Pt discharged in stable condition at 10:50      1. Annual physical exam  -     CBC Auto Differential; Future  -     Comprehensive Metabolic Panel; Future  -     Hemoglobin A1C; Future  -     Hepatitis C Antibody; Future  -     HIV Screen; Future  -     Lipid, Fasting; Future  -     TSH with Reflex; Future  2. Dermatitis  -     triamcinolone (KENALOG) 0.1 % lotion; Apply topically 3 times daily. , Disp-60 mL, R-1, Normal       Orders Placed This Encounter   Procedures    CBC Auto Differential     Standing Status:   Future     Standing Expiration Date:   11/30/2022    Comprehensive Metabolic Panel     Standing Status:   Future     Standing Expiration Date:   11/30/2022    Hemoglobin A1C     Standing Status:   Future     Standing Expiration Date:   11/30/2022    Hepatitis C Antibody     Standing Status:   Future     Standing Expiration Date:   11/30/2022    HIV Screen     Standing Status:   Future     Standing Expiration Date:   11/30/2022    Lipid, Fasting     Standing Status:   Future     Standing Expiration Date:   11/30/2022    TSH with Reflex     Standing Status:   Future     Standing Expiration Date:   11/30/2022       No follow-ups on file.     Cristina Spatz, MD, MD    11/30/2021  10:00 AM

## 2021-12-22 ENCOUNTER — TELEPHONE (OUTPATIENT)
Dept: FAMILY MEDICINE CLINIC | Age: 46
End: 2021-12-22

## 2021-12-22 DIAGNOSIS — L30.9 DERMATITIS: Primary | ICD-10-CM

## 2021-12-22 RX ORDER — LORATADINE 10 MG/1
10 TABLET ORAL DAILY
Qty: 30 TABLET | Refills: 3 | Status: SHIPPED | OUTPATIENT
Start: 2021-12-22 | End: 2022-01-21

## 2021-12-22 NOTE — TELEPHONE ENCOUNTER
----- Message from [facility] sent at 12/20/2021  1:59 PM EST -----  Subject: Message to Provider    QUESTIONS  Information for Provider? Pt needs pt to contact back regarding a   different prescription that he needs immediately. Office did not answer. Please contact pt back at yor earliest convenience. ---------------------------------------------------------------------------  --------------  Sylvia Plan INFO  What is the best way for the office to contact you? OK to leave message on   voicemail  Preferred Call Back Phone Number? [phone]  ---------------------------------------------------------------------------  --------------  SCRIPT ANSWERS  Relationship to Patient?  Self

## 2021-12-22 NOTE — TELEPHONE ENCOUNTER
Spoke with patient and he states that he was taking kenalog ointment and he says that this medication is not working and states that Dr. Tariq Ramirez says that he can prescribe something in pill form to help with his rash on his chest.  Pharmacy verified.

## 2021-12-27 NOTE — TELEPHONE ENCOUNTER
----- Message from Juhi Jackson sent at 12/21/2021  9:35 AM EST -----  Subject: Refill Request    QUESTIONS  Name of Medication? Other - unsure   Patient-reported dosage and instructions? prn  How many days do you have left? 0  Preferred Pharmacy? [id number]  Pharmacy phone number (if available)? [phone]  Additional Information for Provider? patient left message on 12/20/2021   regarding rash and needs medication called into pharmacy-   ---------------------------------------------------------------------------  --------------  [address]  What is the best way for the office to contact you? OK to leave message on   voicemail  Preferred Call Back Phone Number?  [phone]

## 2022-01-02 ENCOUNTER — HOSPITAL ENCOUNTER (EMERGENCY)
Age: 47
Discharge: HOME | End: 2022-01-02
Payer: MEDICARE

## 2022-01-02 VITALS
BODY MASS INDEX: 29.92 KG/M2 | RESPIRATION RATE: 16 BRPM | SYSTOLIC BLOOD PRESSURE: 125 MMHG | WEIGHT: 209 LBS | DIASTOLIC BLOOD PRESSURE: 82 MMHG | OXYGEN SATURATION: 97 % | HEART RATE: 90 BPM | TEMPERATURE: 97.9 F | HEIGHT: 70 IN

## 2022-01-02 DIAGNOSIS — J34.89 NOSE IRRITATION: Primary | ICD-10-CM

## 2022-01-02 PROCEDURE — 99283 EMERGENCY DEPT VISIT LOW MDM: CPT

## 2022-01-02 RX ORDER — MUPIROCIN CALCIUM 20 MG/G
CREAM TOPICAL
Qty: 15 G | Refills: 0 | Status: SHIPPED | OUTPATIENT
Start: 2022-01-02 | End: 2022-02-01

## 2022-01-02 ASSESSMENT — ENCOUNTER SYMPTOMS
DIARRHEA: 0
COLOR CHANGE: 0
EYE ITCHING: 0
BACK PAIN: 0
STRIDOR: 0
NAUSEA: 0
SHORTNESS OF BREATH: 0
EYE REDNESS: 0
ABDOMINAL PAIN: 0
EYE PAIN: 0
PHOTOPHOBIA: 0
COUGH: 0
SINUS PRESSURE: 0
VOICE CHANGE: 0
WHEEZING: 0
TROUBLE SWALLOWING: 0
EYE DISCHARGE: 0
SINUS PAIN: 0
SORE THROAT: 0
RHINORRHEA: 0
FACIAL SWELLING: 0
VOMITING: 0

## 2022-01-02 ASSESSMENT — PAIN DESCRIPTION - LOCATION: LOCATION: NOSE

## 2022-01-02 ASSESSMENT — PAIN SCALES - GENERAL: PAINLEVEL_OUTOF10: 7

## 2022-01-02 NOTE — ED NOTES
Pt discharged in stable condition, VSS, no signs of distress. Discharge instructions and meds reviewed. Pt verbalizes understanding and states no further questions or concerns unaddressed.        Clark Gregory RN  01/02/22 8757

## 2022-01-02 NOTE — ED PROVIDER NOTES
[facility]        Pt Name: Misael San  MRN: [id number]  [place] 1975  Date of evaluation: 1/2/2022  Provider: Jacob Turcios PA-C  PCP: Martha Bernard MD  Note Started: 3:18 PM EST       ARIANA. I have evaluated this patient. My supervising physician was available for consultation. CHIEF COMPLAINT       Chief Complaint   Patient presents with    Other     pt c/o abrasion in left nostril and would like it to heal quickly because it's making his nose run       HISTORY OF PRESENT ILLNESS   (Location, Timing/Onset, Context/Setting, Quality, Duration, Modifying Factors, Severity, Associated Signs and Symptoms)  Note limiting factors. Chief Complaint: abrasion Within the left nostril    Misael San is a 40 y.o. male who presents to the emergency department with suspected abrasion within the left nostril after picking at his nose several days ago. He does have rhinorrhea and every time he has to blow his nose, he feels the irritation in his left nostril. He has not treated this with any medication or topical agent. Denies epistaxis or preceding injury. He rates his discomfort to be a 7 out of 10 on pain scale. Denies rash, swelling, palsy. Nursing Notes were all reviewed and agreed with or any disagreements were addressed in the HPI. REVIEW OF SYSTEMS    (2-9 systems for level 4, 10 or more for level 5)     Review of Systems   Constitutional: Negative for chills and fever. HENT: Negative for congestion, dental problem, drooling, ear discharge, ear pain, facial swelling, hearing loss, mouth sores, nosebleeds, postnasal drip, rhinorrhea, sinus pressure, sinus pain, sneezing, sore throat, tinnitus, trouble swallowing and voice change. Eyes: Negative for photophobia, pain, discharge, redness, itching and visual disturbance. Respiratory: Negative for cough, shortness of breath, wheezing and stridor. Cardiovascular: Negative for chest pain. Gastrointestinal: Negative for abdominal pain, diarrhea, nausea and vomiting. Musculoskeletal: Negative for back pain, neck pain and neck stiffness. Skin: Negative for color change, pallor, rash and wound. Neurological: Negative for dizziness, tremors, seizures, syncope, facial asymmetry, speech difficulty, weakness, light-headedness, numbness and headaches. Psychiatric/Behavioral: Negative for confusion. All other systems reviewed and are negative. Positives and Pertinent negatives as per HPI. Except as noted above in the ROS, all other systems were reviewed and negative. PAST MEDICAL HISTORY     Past Medical History:   Diagnosis Date    Testicular torsion 1995         SURGICAL HISTORY   History reviewed. No pertinent surgical history. [address]       Discharge Medication List as of 1/2/2022  3:10 PM      CONTINUE these medications which have NOT CHANGED    Details   loratadine (CLARITIN) 10 MG tablet Take 1 tablet by mouth daily, Disp-30 tablet, R-3Normal               ALLERGIES     Patient has no known allergies. FAMILYHISTORY     History reviewed. No pertinent family history. SOCIAL HISTORY       Social History     Tobacco Use    Smoking status: Never Smoker    Smokeless tobacco: Never Used   Substance Use Topics    Alcohol use: Yes     Comment: Social    Drug use: Never       SCREENINGS             PHYSICAL EXAM    (up to 7 for level 4, 8 or more for level 5)     ED Triage Vitals [01/02/22 1444]   BP Temp Temp Source Pulse Resp SpO2 Height Weight   125/82 97.9 °F (36.6 °C) Infrared 90 16 97 % 5' 10\" (1.778 m) 209 lb (94.8 kg)       Physical Exam  Vitals and nursing note reviewed. Constitutional:       Appearance: Normal appearance. He is well-developed. He is not toxic-appearing or diaphoretic. HENT:      Head: Normocephalic and atraumatic. Jaw: There is normal jaw occlusion.  No trismus, tenderness, swelling, pain on movement or malocclusion. Salivary Glands: Right salivary gland is not diffusely enlarged or tender. Left salivary gland is not diffusely enlarged or tender. Right Ear: Hearing, tympanic membrane, ear canal and external ear normal.      Left Ear: Hearing, tympanic membrane, ear canal and external ear normal.      Nose: Nasal tenderness (mucosal tenderness on the septum of the left nostril), mucosal edema and rhinorrhea present. No nasal deformity, septal deviation, signs of injury, laceration or congestion. Rhinorrhea is clear. Right Nostril: No foreign body, epistaxis, septal hematoma or occlusion. Left Nostril: No foreign body, epistaxis, septal hematoma or occlusion. Right Turbinates: Not enlarged, swollen or pale. Left Turbinates: Not enlarged, swollen or pale. Right Sinus: No maxillary sinus tenderness or frontal sinus tenderness. Left Sinus: No maxillary sinus tenderness or frontal sinus tenderness. Mouth/Throat:      Lips: Pink. Mouth: Mucous membranes are moist.      Dentition: No dental tenderness. Tongue: No lesions. Tongue does not deviate from midline. Palate: No mass and lesions. Pharynx: Oropharynx is clear. Uvula midline. No uvula swelling. Tonsils: No tonsillar exudate or tonsillar abscesses. 1+ on the right. 1+ on the left. Eyes:      General: No scleral icterus. Right eye: No discharge. Left eye: No discharge. Extraocular Movements: Extraocular movements intact. Conjunctiva/sclera: Conjunctivae normal.      Pupils: Pupils are equal, round, and reactive to light. Cardiovascular:      Rate and Rhythm: Normal rate. Pulmonary:      Effort: Pulmonary effort is normal.      Breath sounds: Normal breath sounds. Musculoskeletal:         General: Normal range of motion. Cervical back: Normal range of motion. Skin:     General: Skin is warm and dry.       Capillary Refill: Capillary refill takes less than 2 seconds. Coloration: Skin is not jaundiced or pale. Findings: No bruising, erythema, lesion or rash. Neurological:      General: No focal deficit present. Mental Status: He is alert and oriented to person, place, and time. Psychiatric:         Behavior: Behavior normal.         DIAGNOSTIC RESULTS   LABS:    Labs Reviewed - No data to display    When ordered only abnormal lab results are displayed. All other labs were within normal range or not returned as of this dictation. EKG: When ordered, EKG's are interpreted by the Emergency Department Physician in the absence of a cardiologist.  Please see their note for interpretation of EKG. RADIOLOGY:   Non-plain film images such as CT, Ultrasound and MRI are read by the radiologist. Plain radiographic images are visualized and preliminarily interpreted by the ED Provider with the below findings:        Interpretation per the Radiologist below, if available at the time of this note:    No orders to display     No results found. PROCEDURES   Unless otherwise noted below, none     Procedures    CRITICAL CARE TIME   N/A    CONSULTS:  None      EMERGENCY DEPARTMENT COURSE and DIFFERENTIAL DIAGNOSIS/MDM:   Vitals:    Vitals:    01/02/22 1444   BP: 125/82   Pulse: 90   Resp: 16   Temp: 97.9 °F (36.6 °C)   TempSrc: Infrared   SpO2: 97%   Weight: 209 lb (94.8 kg)   Height: 5' 10\" (1.778 m)       Patient was given the following medications:  Medications - No data to display        Patient presents with some irritation inside of the left nostril. Does have rhinorrhea and mucosal edema without distinct abrasion or laceration noted on my exam.  He has no sinus tenderness, facial palsy, rash or edema. He is neurologically intact. Patient will be sent home with topical antibiotic agent to apply with in the left nostril. Advised to return for any worsening symptoms.   Follow-up with PCP for recheck and may return to ED per discharge instructions. My suspicion is low for  acute CVA, ICH, SAH, TIA, meningitis, encephalitis, pseudotumor cerebri, temporal arteritis, sentinel bleed from ruptured aneurysm, hypertensive urgency or emergency, subdural hematoma, epidural hematoma, cerebellar compromise, posterior stroke, bells palsy, TMJ syndrome, trigeminal neuralgia, dental abscess, Steven angina, otitis, acute pharyngitis, peritonsillar or tonsillar abscess, retropharyngeal abscess, bacterial tracheitis, epiglottitis, meningitis, encephalitis, foreign body, angioedema, anaphylaxis, mononucleosis, mumps, lymphoma, leukemia, asthma exacerbation, osteomyelitis, mastoiditis, sepsis, DKA, impetigo, or other concerning pathology. FINAL IMPRESSION      1. Nose irritation          DISPOSITION/PLAN   DISPOSITION Decision To Discharge 01/02/2022 02:56:00 PM      PATIENT REFERRED TO:  Kristen Vela MD  [address] [phone]      for follow up in 1-3 days    [facility] Emergency Department  [address] [facility]  [phone]    If symptoms worsen      DISCHARGE MEDICATIONS:  Discharge Medication List as of 1/2/2022  3:10 PM      START taking these medications    Details   mupirocin (BACTROBAN) 2 % cream Apply topically 3 times daily. , Disp-15 g, R-0, Normal             DISCONTINUED MEDICATIONS:  Discharge Medication List as of 1/2/2022  3:10 PM                 (Please note that portions of this note were completed with a voice recognition program.  Efforts were made to edit the dictations but occasionally words are mis-transcribed.)    Sharifa Owen PA-C (electronically signed)           Sharifa Owen PA-C  01/02/22 9247

## 2022-01-26 ENCOUNTER — TELEPHONE (OUTPATIENT)
Dept: FAMILY MEDICINE CLINIC | Age: 47
End: 2022-01-26

## 2022-01-26 DIAGNOSIS — L29.9 GENERALIZED PRURITUS: Primary | ICD-10-CM

## 2022-01-26 RX ORDER — HYDROXYZINE HYDROCHLORIDE 25 MG/1
25 TABLET, FILM COATED ORAL EVERY 8 HOURS PRN
Qty: 30 TABLET | Refills: 0 | Status: SHIPPED | OUTPATIENT
Start: 2022-01-26 | End: 2022-02-05

## 2022-01-26 NOTE — TELEPHONE ENCOUNTER
I sent a prescription for hydroxyzine, and oral antihistamine that is stronger against itching. He should be using the steroid cream or moisturizing lotion on the dry skin.

## 2022-01-26 NOTE — TELEPHONE ENCOUNTER
----- Message from Maddie Jain sent at 1/26/2022 11:31 AM EST -----  Subject: Medication Problem    QUESTIONS  Name of Medication? loratadine (CLARITIN) 10 MG tablet  Patient-reported dosage and instructions? once a day  What question or problem do you have with the medication? patient is   requesting a stronger medication. He is still having itching. Please call   patient and advise. Preferred Pharmacy? [facility] [address] [place] [address]  Pharmacy phone number (if available)? [phone]  Additional Information for Provider?   ---------------------------------------------------------------------------  --------------  CALL BACK INFO  What is the best way for the office to contact you? OK to leave message on   voicemail  Preferred Call Back Phone Number? [phone]  ---------------------------------------------------------------------------  --------------  SCRIPT ANSWERS  Relationship to Patient?  Self

## 2022-03-30 ENCOUNTER — TELEPHONE (OUTPATIENT)
Dept: FAMILY MEDICINE CLINIC | Age: 47
End: 2022-03-30

## 2022-03-30 DIAGNOSIS — L29.9 GENERALIZED PRURITUS: Primary | ICD-10-CM

## 2022-03-30 DIAGNOSIS — L30.9 DERMATITIS: ICD-10-CM

## 2022-03-30 RX ORDER — LORATADINE 10 MG/1
10 TABLET ORAL DAILY
Qty: 30 TABLET | Refills: 1 | Status: SHIPPED | OUTPATIENT
Start: 2022-03-30 | End: 2022-04-29

## 2022-03-30 RX ORDER — CLOTRIMAZOLE 1 %
CREAM (GRAM) TOPICAL
Qty: 60 G | Refills: 1 | Status: SHIPPED | OUTPATIENT
Start: 2022-03-30 | End: 2022-06-13 | Stop reason: SDUPTHER

## 2022-03-30 NOTE — TELEPHONE ENCOUNTER
Pt said he was prescribed hydrOXYzine (ATARAX) 25 MG tablet for rashes and itching on arms and its not helping wants to know if something else can be prescribed? Also said he got clotrimazole (LOTRIMIN) 1 % cream prescribed for leg rashes from the hospital , wants to know if that can be prescribed again too?

## 2022-03-30 NOTE — TELEPHONE ENCOUNTER
Yes, we can try clotrimazole in case it is a fungal infection. If the hydroxyzine is not helping, he should discontinue it. I sent a prescription for Claritin, which could help his itching.

## 2022-04-05 ENCOUNTER — TELEPHONE (OUTPATIENT)
Dept: FAMILY MEDICINE CLINIC | Age: 47
End: 2022-04-05

## 2022-04-05 NOTE — TELEPHONE ENCOUNTER
There are not stronger doses for those meds. Since his symptoms are getting worse, he should be seen in the office because may need to take a different approach.

## 2022-04-05 NOTE — TELEPHONE ENCOUNTER
Pt would like to know if both scripts can be stronger and sent to the pharmacy. Pt states rash is spreading.   Please call pt and advise how he is to move forward    clotrimazole (LOTRIMIN AF) 1 % cream and    loratadine (CLARITIN) 10 MG tablet

## 2022-04-08 ENCOUNTER — OFFICE VISIT (OUTPATIENT)
Dept: FAMILY MEDICINE CLINIC | Age: 47
End: 2022-04-08
Payer: MEDICARE

## 2022-04-08 VITALS
WEIGHT: 223 LBS | HEART RATE: 85 BPM | SYSTOLIC BLOOD PRESSURE: 118 MMHG | DIASTOLIC BLOOD PRESSURE: 70 MMHG | BODY MASS INDEX: 32 KG/M2 | OXYGEN SATURATION: 98 %

## 2022-04-08 DIAGNOSIS — L29.9 GENERALIZED PRURITUS: Primary | ICD-10-CM

## 2022-04-08 DIAGNOSIS — R21 RASH: ICD-10-CM

## 2022-04-08 PROCEDURE — 1036F TOBACCO NON-USER: CPT | Performed by: FAMILY MEDICINE

## 2022-04-08 PROCEDURE — 99213 OFFICE O/P EST LOW 20 MIN: CPT | Performed by: FAMILY MEDICINE

## 2022-04-08 PROCEDURE — G8417 CALC BMI ABV UP PARAM F/U: HCPCS | Performed by: FAMILY MEDICINE

## 2022-04-08 PROCEDURE — G8427 DOCREV CUR MEDS BY ELIG CLIN: HCPCS | Performed by: FAMILY MEDICINE

## 2022-04-08 RX ORDER — HYDROXYZINE HYDROCHLORIDE 25 MG/1
25 TABLET, FILM COATED ORAL 3 TIMES DAILY PRN
COMMUNITY

## 2022-04-08 NOTE — PROGRESS NOTES
[address] Note    Date: 4/8/2022                                               Ewelina Ko:     Chief Complaint   Patient presents with    Rash       HPI   Patient reports intermittent rash on his trunk starting last year. Gets small papules intermittently. Uses moisturizing lotion daily. Reports that he has itching of his skin frequently. Has tried Claritin and Atarax, did not help. There are no problems to display for this patient. Past Medical History:   Diagnosis Date    Testicular torsion 1995       Current Outpatient Medications   Medication Sig Dispense Refill    hydrOXYzine (ATARAX) 25 MG tablet Take 25 mg by mouth 3 times daily as needed for Itching      clotrimazole (LOTRIMIN AF) 1 % cream Apply topically 2 times daily. 60 g 1    loratadine (CLARITIN) 10 MG tablet Take 1 tablet by mouth daily 30 tablet 1     No current facility-administered medications for this visit. No Known Allergies    Review of Systems   No fever, no cough    Vitals:  /70   Pulse 85   Wt 223 lb (101.2 kg)   SpO2 98%   BMI 32.00 kg/m²     Wt Readings from Last 3 Encounters:   04/08/22 223 lb (101.2 kg)   01/02/22 209 lb (94.8 kg)   11/30/21 214 lb (97.1 kg)        Physical Exam   General:  Well-appearing, NAD, alert, non-toxic  HEENT:  Normocephalic, atraumatic. CHEST/LUNGS: No dyspnea  EXTREMETIES: Normal movement of all extremities. No edema. No joint swelling. SKIN:  No rash, no cellulitis, no bruising, no petechiae/purpura/vesicles/pustules/abscess  PSYCH:  A+O x 3; normal affect  NEURO:  GCS 15, CN2-12 grossly intact, no focal motor/sensory deficits, normal gait, normal speech      Assessment/Plan     60-year-old male with complaint of intermittent rash. He showed me a small papule on his cell phone camera, and it seems very minor. Does not appear to be an infection or neoplasm.   It continues to occur, so I will refer him to dermatology for further evaluation and treatment. He has generalized pruritus that has not helped with Claritin and hydroxyzine, so I recommend seeing dermatology for this as well. Patient encouraged to get his annual labs drawn that were ordered several months ago, would like to check bilirubin to ensure there is no hyperbilirubinemia. Discharged in stable condition at 11:05 AM.    1. Generalized pruritus  -     External Referral To Dermatology  2. Rash  -     External Referral To Dermatology       Orders Placed This Encounter   Procedures    External Referral To Dermatology     Referral Priority:   Routine     Referral Type:   Eval and Treat     Referral Reason:   Specialty Services Required     Requested Specialty:   Dermatology     Number of Visits Requested:   1       No follow-ups on file.     Cande Hu MD, MD    4/8/2022  11:08 AM

## 2022-05-20 DIAGNOSIS — Z00.00 ANNUAL PHYSICAL EXAM: ICD-10-CM

## 2022-05-20 LAB
BASOPHILS ABSOLUTE: 0 K/UL (ref 0–0.2)
BASOPHILS RELATIVE PERCENT: 0.7 %
EOSINOPHILS ABSOLUTE: 0.1 K/UL (ref 0–0.6)
EOSINOPHILS RELATIVE PERCENT: 1.5 %
HCT VFR BLD CALC: 45.7 % (ref 40.5–52.5)
HEMOGLOBIN: 15 G/DL (ref 13.5–17.5)
LYMPHOCYTES ABSOLUTE: 1.6 K/UL (ref 1–5.1)
LYMPHOCYTES RELATIVE PERCENT: 38.3 %
MCH RBC QN AUTO: 29.5 PG (ref 26–34)
MCHC RBC AUTO-ENTMCNC: 32.8 G/DL (ref 31–36)
MCV RBC AUTO: 90.1 FL (ref 80–100)
MONOCYTES ABSOLUTE: 0.3 K/UL (ref 0–1.3)
MONOCYTES RELATIVE PERCENT: 7.6 %
NEUTROPHILS ABSOLUTE: 2.1 K/UL (ref 1.7–7.7)
NEUTROPHILS RELATIVE PERCENT: 51.9 %
PDW BLD-RTO: 14.7 % (ref 12.4–15.4)
PLATELET # BLD: 246 K/UL (ref 135–450)
PMV BLD AUTO: 8.8 FL (ref 5–10.5)
RBC # BLD: 5.08 M/UL (ref 4.2–5.9)
WBC # BLD: 4.1 K/UL (ref 4–11)

## 2022-05-21 LAB
A/G RATIO: 1.6 (ref 1.1–2.2)
ALBUMIN SERPL-MCNC: 4.4 G/DL (ref 3.4–5)
ALP BLD-CCNC: 82 U/L (ref 40–129)
ALT SERPL-CCNC: 24 U/L (ref 10–40)
ANION GAP SERPL CALCULATED.3IONS-SCNC: 19 MMOL/L (ref 3–16)
AST SERPL-CCNC: 27 U/L (ref 15–37)
BILIRUB SERPL-MCNC: 0.3 MG/DL (ref 0–1)
BUN BLDV-MCNC: 8 MG/DL (ref 7–20)
CALCIUM SERPL-MCNC: 9.3 MG/DL (ref 8.3–10.6)
CHLORIDE BLD-SCNC: 103 MMOL/L (ref 99–110)
CHOLESTEROL, FASTING: 214 MG/DL (ref 0–199)
CO2: 19 MMOL/L (ref 21–32)
CREAT SERPL-MCNC: 1.2 MG/DL (ref 0.9–1.3)
ESTIMATED AVERAGE GLUCOSE: 119.8 MG/DL
GFR AFRICAN AMERICAN: >60
GFR NON-AFRICAN AMERICAN: >60
GLUCOSE BLD-MCNC: 134 MG/DL (ref 70–99)
HBA1C MFR BLD: 5.8 %
HDLC SERPL-MCNC: 60 MG/DL (ref 40–60)
HEPATITIS C ANTIBODY INTERPRETATION: NORMAL
HIV AG/AB: NORMAL
HIV ANTIGEN: NORMAL
HIV-1 ANTIBODY: NORMAL
HIV-2 AB: NORMAL
LDL CHOLESTEROL CALCULATED: 120 MG/DL
POTASSIUM SERPL-SCNC: 4.1 MMOL/L (ref 3.5–5.1)
SODIUM BLD-SCNC: 141 MMOL/L (ref 136–145)
TOTAL PROTEIN: 7.2 G/DL (ref 6.4–8.2)
TRIGLYCERIDE, FASTING: 168 MG/DL (ref 0–150)
TSH REFLEX: 0.75 UIU/ML (ref 0.27–4.2)
VLDLC SERPL CALC-MCNC: 34 MG/DL

## 2022-06-13 DIAGNOSIS — L30.9 DERMATITIS: ICD-10-CM

## 2022-06-13 RX ORDER — CLOTRIMAZOLE 1 %
CREAM (GRAM) TOPICAL
Qty: 60 G | Refills: 1 | Status: SHIPPED | OUTPATIENT
Start: 2022-06-13 | End: 2022-06-20 | Stop reason: SDUPTHER

## 2022-06-13 NOTE — TELEPHONE ENCOUNTER
Medication and Quantity requested: Clotrimazole 1% crm  Patient has a rash on leg and ran out of medication   Patient is in [address] Visit  4-8-22,Dr. Sachin Mcgee and phone number updated in EPIC:  [facility] [address] [facility] patient after sent

## 2022-06-20 DIAGNOSIS — L30.9 DERMATITIS: ICD-10-CM

## 2022-06-20 RX ORDER — CLOTRIMAZOLE 1 %
CREAM (GRAM) TOPICAL
Qty: 30 G | Refills: 0 | Status: SHIPPED | OUTPATIENT
Start: 2022-06-20

## 2022-06-20 NOTE — TELEPHONE ENCOUNTER
Medication and Quantity requested: clotrimazole-betamethasone (LOTRISONE) 1-0.05 % cream          Last Visit  4/8/2022    Pharmacy and phone number updated in [facility]:  Yes      [id number] [employer]  [phone] [facility]

## 2022-06-20 NOTE — TELEPHONE ENCOUNTER
Medication:   Requested Prescriptions     Pending Prescriptions Disp Refills    clotrimazole (LOTRIMIN AF) 1 % cream 60 g 1     Sig: Apply topically 2 times daily. Last Filled:      Patient Phone Number: [phone] (home)     Last appt: 4/8/2022   Next appt: Visit date not found    Last OARRS: No flowsheet data found.

## 2022-06-21 NOTE — TELEPHONE ENCOUNTER
Patient wants the   clotrimazole-betamethasone (LOTRISONE) 1-0.05 % cream    With the betamethasone 83

## 2022-06-22 DIAGNOSIS — L30.9 DERMATITIS: ICD-10-CM

## 2022-06-22 RX ORDER — CLOTRIMAZOLE 1 %
CREAM (GRAM) TOPICAL
Qty: 30 G | Refills: 0 | OUTPATIENT
Start: 2022-06-22

## 2022-06-22 RX ORDER — CLOTRIMAZOLE AND BETAMETHASONE DIPROPIONATE 10; .64 MG/G; MG/G
CREAM TOPICAL
Qty: 45 G | Refills: 0 | Status: SHIPPED | OUTPATIENT
Start: 2022-06-22 | End: 2022-07-06 | Stop reason: SDUPTHER

## 2022-06-22 NOTE — TELEPHONE ENCOUNTER
Medication and Quantity requested:   Clotrimazole cream    Last Visit  4/8/2022    Pharmacy and phone number updated in EPIC:  yes

## 2022-07-06 RX ORDER — CLOTRIMAZOLE AND BETAMETHASONE DIPROPIONATE 10; .64 MG/G; MG/G
CREAM TOPICAL
Qty: 45 G | Refills: 2 | Status: SHIPPED | OUTPATIENT
Start: 2022-07-06 | End: 2022-07-08 | Stop reason: SDUPTHER

## 2022-07-06 NOTE — TELEPHONE ENCOUNTER
Medication and Quantity requested: clotrimazole-betamethasone (LOTRISONE) 1-0.05 % cream       **Patient is specifically requesting the CLOTRIMAZOLE-BETAMETHASONE**     Patient is requesting multiple refills due to needing to use the twice a day, and the tube is little. Tube lasts only 2 weeks.        Last Visit  4/8/2022    Pharmacy and phone number updated in [facility]:  Yes      [facility] #[id number] in [address] [place] [address]

## 2022-07-06 NOTE — TELEPHONE ENCOUNTER
Medication:   Requested Prescriptions     Pending Prescriptions Disp Refills    clotrimazole-betamethasone (LOTRISONE) 1-0.05 % cream 45 g 2     Sig: Apply topically 2 times daily. Last Filled:      Patient Phone Number: [phone] (home)     Last appt: 4/8/2022   Next appt: Visit date not found    Last OARRS: No flowsheet data found.

## 2022-07-07 NOTE — TELEPHONE ENCOUNTER
clotrimazole-betamethasone (LOTRISONE) 1-0.05 % cream      Pt called and said the pharmacy he gave us yesterday didn;t have this cream.    Pt asked that we resend to this location:    [facility]   [address] [facility]. [place], [address] [facility]  Ph. [phone]    Need sent today. I was unable to add the above pharmacy.

## 2022-07-08 RX ORDER — CLOTRIMAZOLE AND BETAMETHASONE DIPROPIONATE 10; .64 MG/G; MG/G
CREAM TOPICAL
Qty: 45 G | Refills: 2 | Status: SHIPPED | OUTPATIENT
Start: 2022-07-08 | End: 2022-08-05 | Stop reason: SDUPTHER

## 2022-07-28 ENCOUNTER — TELEPHONE (OUTPATIENT)
Dept: FAMILY MEDICINE CLINIC | Age: 47
End: 2022-07-28

## 2022-07-28 NOTE — TELEPHONE ENCOUNTER
----- Message from Madicindi Desire sent at 7/28/2022  1:30 PM EDT -----  Subject: Message to Provider    QUESTIONS  Information for Provider? Pt called in and want to know if provider can   give the pt a call about a medication for a rash and want to know if   provider found out anything for rash .  ---------------------------------------------------------------------------  --------------  Karlene LUTZ  [phone]; OK to leave message on voicemail  ---------------------------------------------------------------------------  --------------  SCRIPT ANSWERS  Relationship to Patient?  Self

## 2022-08-05 RX ORDER — CLOTRIMAZOLE AND BETAMETHASONE DIPROPIONATE 10; .64 MG/G; MG/G
CREAM TOPICAL
Qty: 45 G | Refills: 2 | Status: SHIPPED | OUTPATIENT
Start: 2022-08-05 | End: 2022-08-17 | Stop reason: SDUPTHER

## 2022-08-05 NOTE — TELEPHONE ENCOUNTER
Medication and Quantity requested:    clotrimazole-betamethasone (LOTRISONE) 1-0.05 % cream    Last Visit    04/08/2022  Pharmacy and phone number updated in [facility]:      Yes  [facility]         ALSO PATIENT WILL BE IN [place] FOR THE NEXT 4 WEEKS UNABLE TO SCHEDULE A VV APPT.      PLEASE CALL AND LET HIM KNOW WHAT HE SHOULD DO

## 2022-08-05 NOTE — TELEPHONE ENCOUNTER
Medication:   Requested Prescriptions     Pending Prescriptions Disp Refills    clotrimazole-betamethasone (LOTRISONE) 1-0.05 % cream 45 g 2     Sig: Apply topically 2 times daily. Last Filled:  7/8/2022    Patient Phone Number: [phone] (home)     Last appt: 4/8/2022   Next appt: Visit date not found    Last OARRS: No flowsheet data found.

## 2022-08-17 RX ORDER — CLOTRIMAZOLE AND BETAMETHASONE DIPROPIONATE 10; .64 MG/G; MG/G
CREAM TOPICAL
Qty: 45 G | Refills: 2 | Status: SHIPPED | OUTPATIENT
Start: 2022-08-17 | End: 2022-08-29 | Stop reason: SDUPTHER

## 2022-08-17 NOTE — TELEPHONE ENCOUNTER
Medication:   Requested Prescriptions     Pending Prescriptions Disp Refills    clotrimazole-betamethasone (LOTRISONE) 1-0.05 % cream 45 g 2     Sig: Apply topically 2 times daily. Last Filled:  8/5/2022    Patient Phone Number: [phone] (home)     Last appt: 4/8/2022   Next appt: Visit date not found    Last OARRS: No flowsheet data found.

## 2022-08-17 NOTE — TELEPHONE ENCOUNTER
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

## 2022-08-19 NOTE — TELEPHONE ENCOUNTER
Spoke with Juni Chen (pharmacy) and he stated insurance will not refill it until 8/22/2022. Left VM for pt to call back and receive above message.

## 2022-08-19 NOTE — TELEPHONE ENCOUNTER
Patient still waiting on this prescription     He also states if you keep the dosage the same just needs more QTY    Please call him and let him know when this medicine gets called in

## 2022-08-29 ENCOUNTER — TELEPHONE (OUTPATIENT)
Dept: FAMILY MEDICINE CLINIC | Age: 47
End: 2022-08-29

## 2022-08-29 RX ORDER — CLOTRIMAZOLE AND BETAMETHASONE DIPROPIONATE 10; .64 MG/G; MG/G
CREAM TOPICAL
Qty: 45 G | Refills: 2 | Status: SHIPPED | OUTPATIENT
Start: 2022-08-29 | End: 2022-09-22 | Stop reason: SDUPTHER

## 2022-08-29 NOTE — TELEPHONE ENCOUNTER
Medication and Quantity requested: Clotrimazole-betamethasone 05%      Last Visit  4-8-22,Dr. Sachin Mcgee and phone number updated in EPIC[facility]  [facility]

## 2022-08-29 NOTE — TELEPHONE ENCOUNTER
Medication:   Requested Prescriptions     Pending Prescriptions Disp Refills    clotrimazole-betamethasone (LOTRISONE) 1-0.05 % cream 45 g 2     Sig: Apply topically 2 times daily. Last Filled:  8/17/22 with 2 refills    Patient Phone Number: [phone] (home)     Last appt: 4/8/2022   Next appt: Visit date not found    Last OARRS: No flowsheet data found.

## 2022-08-29 NOTE — TELEPHONE ENCOUNTER
Patient states he needs a new RX for Clotrimazole-bethamethasone 0.05% cream  Patient states he picked up the 8-17-22 and is out  [facility]

## 2022-09-22 RX ORDER — CLOTRIMAZOLE AND BETAMETHASONE DIPROPIONATE 10; .64 MG/G; MG/G
CREAM TOPICAL
Qty: 45 G | Refills: 2 | Status: SHIPPED | OUTPATIENT
Start: 2022-09-22 | End: 2022-09-28 | Stop reason: SDUPTHER

## 2022-09-22 NOTE — TELEPHONE ENCOUNTER
Medication and Quantity requested: Clotrimazole-noemi 1-0.5%     Last Visit  4-8-22,Dr. Sachin Mcgee and phone number updated in EPIC:  [facility]

## 2022-09-22 NOTE — TELEPHONE ENCOUNTER
Medication:   Requested Prescriptions     Pending Prescriptions Disp Refills    clotrimazole-betamethasone (LOTRISONE) 1-0.05 % cream 45 g 2     Sig: Apply topically 2 times daily. Last Filled:  45 g  x 2 RF 8/29/22    Patient Phone Number: [phone] (home)     Last appt: 4/8/2022   Next appt: Visit date not found    Last OARRS: No flowsheet data found.

## 2022-09-28 RX ORDER — CLOTRIMAZOLE AND BETAMETHASONE DIPROPIONATE 10; .64 MG/G; MG/G
CREAM TOPICAL
Qty: 45 G | Refills: 2 | Status: SHIPPED | OUTPATIENT
Start: 2022-09-28

## 2022-09-28 NOTE — TELEPHONE ENCOUNTER
Medication and Quantity requested:    clotrimazole-betamethasone (LOTRISONE) 1-0.05 % cream      Last Visit    04/08/2022    Pharmacy and phone number updated in [facility]:  yes    BRIGITTE Hernandez

## 2022-09-28 NOTE — TELEPHONE ENCOUNTER
Medication:   Requested Prescriptions     Pending Prescriptions Disp Refills    clotrimazole-betamethasone (LOTRISONE) 1-0.05 % cream 45 g 2     Sig: Apply topically 2 times daily. Last Filled: 9/22/2022, 45g, 2     Patient Phone Number: [phone] (home)     Last appt: 4/8/2022   Next appt: Visit date not found    Last OARRS: No flowsheet data found.

## 2023-08-30 NOTE — ED PROVIDER NOTE - CCCP TRG CHIEF CMPLNT
Do we have 2mg available?   If so please give one pen    If not please give 2 boxes of 1mg dose if available    Thanks toothache

## 2025-03-10 ENCOUNTER — EMERGENCY (EMERGENCY)
Facility: HOSPITAL | Age: 50
LOS: 1 days | Discharge: LEFT BEFORE DISCHARGE | End: 2025-03-10
Admitting: EMERGENCY MEDICINE
Payer: SELF-PAY

## 2025-03-10 VITALS
RESPIRATION RATE: 18 BRPM | HEART RATE: 70 BPM | OXYGEN SATURATION: 95 % | HEIGHT: 70 IN | DIASTOLIC BLOOD PRESSURE: 68 MMHG | WEIGHT: 184.97 LBS | SYSTOLIC BLOOD PRESSURE: 103 MMHG | TEMPERATURE: 98 F

## 2025-03-10 PROCEDURE — 93010 ELECTROCARDIOGRAM REPORT: CPT

## 2025-03-10 PROCEDURE — L9991: CPT

## 2025-03-10 NOTE — ED ADULT TRIAGE NOTE - CHIEF COMPLAINT QUOTE
c/o diffused abd pain, bloating for 1 month, pt reports symptoms are causing periods of SOB, denies Phx, ETOH use today.

## 2025-03-11 PROBLEM — Z78.9 OTHER SPECIFIED HEALTH STATUS: Chronic | Status: ACTIVE | Noted: 2020-06-29

## 2025-04-03 ENCOUNTER — EMERGENCY (EMERGENCY)
Facility: HOSPITAL | Age: 50
LOS: 1 days | Discharge: LEFT BEFORE DISCHARGE | End: 2025-04-03
Admitting: EMERGENCY MEDICINE
Payer: MEDICAID

## 2025-04-03 VITALS
DIASTOLIC BLOOD PRESSURE: 73 MMHG | HEIGHT: 70 IN | HEART RATE: 81 BPM | SYSTOLIC BLOOD PRESSURE: 126 MMHG | RESPIRATION RATE: 18 BRPM | WEIGHT: 169.98 LBS | OXYGEN SATURATION: 98 % | TEMPERATURE: 98 F

## 2025-04-03 PROCEDURE — 93010 ELECTROCARDIOGRAM REPORT: CPT

## 2025-04-03 PROCEDURE — L9991: CPT

## 2025-07-08 ENCOUNTER — EMERGENCY (EMERGENCY)
Facility: HOSPITAL | Age: 50
LOS: 1 days | End: 2025-07-08
Attending: EMERGENCY MEDICINE | Admitting: EMERGENCY MEDICINE
Payer: MEDICAID

## 2025-07-08 VITALS
SYSTOLIC BLOOD PRESSURE: 128 MMHG | OXYGEN SATURATION: 100 % | RESPIRATION RATE: 16 BRPM | TEMPERATURE: 98 F | HEART RATE: 60 BPM | DIASTOLIC BLOOD PRESSURE: 76 MMHG | WEIGHT: 160.06 LBS

## 2025-07-08 PROCEDURE — 99284 EMERGENCY DEPT VISIT MOD MDM: CPT

## 2025-07-08 NOTE — ED ADULT TRIAGE NOTE - CHIEF COMPLAINT QUOTE
pt complains of SOB x 1 month   . pt denies ETOH/drug use,  changes in vision, chest pain, headache, nausea, dizziness, vomiting, fever, and  chills. pt denies  past medical hx .

## 2025-07-09 VITALS
OXYGEN SATURATION: 99 % | SYSTOLIC BLOOD PRESSURE: 115 MMHG | TEMPERATURE: 98 F | RESPIRATION RATE: 16 BRPM | HEART RATE: 59 BPM | DIASTOLIC BLOOD PRESSURE: 74 MMHG

## 2025-07-09 PROCEDURE — 93010 ELECTROCARDIOGRAM REPORT: CPT

## 2025-07-09 PROCEDURE — 74019 RADEX ABDOMEN 2 VIEWS: CPT | Mod: 26

## 2025-07-09 PROCEDURE — 71046 X-RAY EXAM CHEST 2 VIEWS: CPT | Mod: 26

## 2025-07-09 RX ORDER — MAGNESIUM, ALUMINUM HYDROXIDE 200-200 MG
30 TABLET,CHEWABLE ORAL ONCE
Refills: 0 | Status: COMPLETED | OUTPATIENT
Start: 2025-07-09 | End: 2025-07-09

## 2025-07-09 RX ORDER — PSEUDOEPHEDRINE HCL 30 MG
60 TABLET ORAL ONCE
Refills: 0 | Status: COMPLETED | OUTPATIENT
Start: 2025-07-09 | End: 2025-07-09

## 2025-07-09 RX ADMIN — Medication 20 MILLIGRAM(S): at 02:35

## 2025-07-09 RX ADMIN — Medication 30 MILLILITER(S): at 02:35

## 2025-07-09 NOTE — ED PROVIDER NOTE - NSFOLLOWUPINSTRUCTIONS_ED_ALL_ED_FT
- You were seen in the emergency department today for abdominal pain and sinus congestion.    - Lab and imaging results were discussed with you along with your discharge diagnosis.    - Follow up with your doctor in 1 week - bring copies of your results.     - Return to the ED for any new, worsening, or concerning symptoms to you.    - Continue all prescribed medications.    - Take ibuprofen/tylenol as directed as needed for pain.     - Rest and keep yourself hydrated with fluids.

## 2025-07-09 NOTE — ED PROVIDER NOTE - CLINICAL SUMMARY MEDICAL DECISION MAKING FREE TEXT BOX
48 y/o M with weeks of SOB.  HD stable and in no resp distress.  ?allergic.  Abd exam is benign, no distension.  Will screen for cardiac etiology with EKG, but very low suspicion for acs; do not suspect primary pulm pathology will screen with XR.  Plan for xr, meds (decongestants and pt requesting GI meds), dc with PMD followup.

## 2025-07-09 NOTE — ED ADULT NURSE NOTE - OBJECTIVE STATEMENT
Pt received in 10a. Pt alert and oriented x 4, ambulatory at baseline, Pt denies past medical history. Pt c/o shortness of breath x 1 month. Pt reports he is unable to breath out of his nose which is making him short of breath. Pt states his abdomen is "bigger" than usual and "not flat after doing crunches". Respirations even and unlabored, NAD. Pt speaking in complete sentences. Pt denies chest pain, N/V/D, weakness, fever, chills,  symptoms. Pt medicated per orders. Bed in lowest position, safety maintained.

## 2025-07-09 NOTE — ED PROVIDER NOTE - OBJECTIVE STATEMENT
50 y/o M with no reported PMH p/w SOB.  Pt reports several weeks of SOB - describes not being able to breathe out of his nose.  Also reports that his abdomen is bigger than usual and not "flat" which he believes is causing his SOB.  No fever, chills, cough, sore throat, cp, abd pain, n/v/c/d, leg pain or swelling.

## 2025-07-09 NOTE — ED PROVIDER NOTE - PATIENT PORTAL LINK FT
You can access the FollowMyHealth Patient Portal offered by [facility] by registering at the following website: [email or web address]. By joining [employer]’s FollowMyHealth portal, you will also be able to view your health information using other applications (apps) compatible with our system.